# Patient Record
Sex: FEMALE | Race: WHITE | NOT HISPANIC OR LATINO | Employment: FULL TIME | URBAN - NONMETROPOLITAN AREA
[De-identification: names, ages, dates, MRNs, and addresses within clinical notes are randomized per-mention and may not be internally consistent; named-entity substitution may affect disease eponyms.]

---

## 2024-01-10 ENCOUNTER — TRANSCRIBE ORDERS (OUTPATIENT)
Dept: ADMINISTRATIVE | Facility: HOSPITAL | Age: 39
End: 2024-01-10
Payer: COMMERCIAL

## 2024-01-10 DIAGNOSIS — Z12.31 ENCOUNTER FOR SCREENING MAMMOGRAM FOR MALIGNANT NEOPLASM OF BREAST: Primary | ICD-10-CM

## 2024-02-26 LAB
NCCN CRITERIA FLAG: NORMAL
TYRER CUZICK SCORE: 13.4

## 2024-03-27 ENCOUNTER — HOSPITAL ENCOUNTER (OUTPATIENT)
Dept: MAMMOGRAPHY | Facility: HOSPITAL | Age: 39
Discharge: HOME | End: 2024-03-27
Admitting: OBSTETRICS & GYNECOLOGY
Payer: COMMERCIAL

## 2024-03-27 DIAGNOSIS — Z12.31 ENCOUNTER FOR SCREENING MAMMOGRAM FOR MALIGNANT NEOPLASM OF BREAST: ICD-10-CM

## 2024-03-27 PROCEDURE — 77063 BREAST TOMOSYNTHESIS BI: CPT

## 2024-03-27 PROCEDURE — 77067 SCR MAMMO BI INCL CAD: CPT

## 2024-04-22 ENCOUNTER — HOSPITAL ENCOUNTER (OUTPATIENT)
Dept: ULTRASOUND IMAGING | Facility: HOSPITAL | Age: 39
Discharge: HOME | End: 2024-04-22
Admitting: OBSTETRICS & GYNECOLOGY
Payer: COMMERCIAL

## 2024-04-22 DIAGNOSIS — R92.8 ABNORMAL MAMMOGRAM: ICD-10-CM

## 2024-04-22 PROCEDURE — 76642 ULTRASOUND BREAST LIMITED: CPT

## 2024-05-28 ENCOUNTER — OFFICE VISIT (OUTPATIENT)
Age: 39
End: 2024-05-28
Payer: COMMERCIAL

## 2024-05-28 VITALS
WEIGHT: 120 LBS | SYSTOLIC BLOOD PRESSURE: 104 MMHG | HEIGHT: 64 IN | BODY MASS INDEX: 20.49 KG/M2 | DIASTOLIC BLOOD PRESSURE: 68 MMHG

## 2024-05-28 DIAGNOSIS — R87.615 UNSATISFACTORY CERVICAL PAPANICOLAOU SMEAR: ICD-10-CM

## 2024-05-28 DIAGNOSIS — Z12.4 ENCOUNTER FOR SCREENING FOR CERVICAL CANCER: Primary | ICD-10-CM

## 2024-05-28 PROCEDURE — 99202 OFFICE O/P NEW SF 15 MIN: CPT | Performed by: OBSTETRICS & GYNECOLOGY

## 2024-05-28 PROCEDURE — G0123 SCREEN CERV/VAG THIN LAYER: HCPCS | Performed by: OBSTETRICS & GYNECOLOGY

## 2024-05-28 PROCEDURE — 87624 HPV HI-RISK TYP POOLED RSLT: CPT | Performed by: OBSTETRICS & GYNECOLOGY

## 2024-05-30 LAB
GEN CATEG CVX/VAG CYTO-IMP: NORMAL
HPV I/H RISK 4 DNA CVX QL PROBE+SIG AMP: NOT DETECTED
LAB AP CASE REPORT: NORMAL
LAB AP GYN ADDITIONAL INFORMATION: NORMAL
LAB AP GYN OTHER FINDINGS: NORMAL
Lab: NORMAL
PATH INTERP SPEC-IMP: NORMAL
STAT OF ADQ CVX/VAG CYTO-IMP: NORMAL

## 2024-06-03 NOTE — PROGRESS NOTES
"Chief Complaint  Gynecologic Exam (Pt here for repeat pap, previous [facility] pt, last pap 1-8-2024, unsatisfactory, neg HPV.)    Subjective        Rocio Cochran presents to [facility] for repeat pap smear after pap smear with Dr. Saucedo was unsatisfactory. Pt reports she was on her cycle at the time of her annual exam. Pt without complaints today. Reports normal menses with normal flow.   History of Present Illness    Objective   Vital Signs:  /68   Ht 162.6 cm (64\")   Wt 54.4 kg (120 lb)   BMI 20.60 kg/m²   Estimated body mass index is 20.6 kg/m² as calculated from the following:    Height as of this encounter: 162.6 cm (64\").    Weight as of this encounter: 54.4 kg (120 lb).       BMI is within normal parameters. No other follow-up for BMI required.      Physical Exam   Normal external genitalia, cervix 1x1 cm smooth without lesions, pap smear obtained, vaginal mucosa pink without lesions, bimanual exam NT/no masses  Result Review :                     Assessment and Plan     Diagnoses and all orders for this visit:    1. Encounter for screening for cervical cancer (Primary)  -     Liquid-based Pap Smear, Screening  -     HPV DNA Probe, Direct - ThinPrep Vial, Cervix, Endocervix    2. Unsatisfactory cervical Papanicolaou smear  39 y/o G0 here for repeat pap smear after unsatisfactory specimen at annual exam. No complaints and pt doing well. RTC yearly.            Follow Up     No follow-ups on file.  Patient was given instructions and counseling regarding her condition or for health maintenance advice. Please see specific information pulled into the AVS if appropriate.         "

## 2025-06-03 ENCOUNTER — OFFICE VISIT (OUTPATIENT)
Age: 40
End: 2025-06-03
Payer: COMMERCIAL

## 2025-06-03 VITALS
SYSTOLIC BLOOD PRESSURE: 112 MMHG | HEIGHT: 64 IN | BODY MASS INDEX: 21.54 KG/M2 | DIASTOLIC BLOOD PRESSURE: 76 MMHG | WEIGHT: 126.2 LBS

## 2025-06-03 DIAGNOSIS — Z12.4 ENCOUNTER FOR SCREENING FOR CERVICAL CANCER: ICD-10-CM

## 2025-06-03 DIAGNOSIS — Z01.419 WOMEN'S ANNUAL ROUTINE GYNECOLOGICAL EXAMINATION: Primary | ICD-10-CM

## 2025-06-03 DIAGNOSIS — Z12.31 ENCOUNTER FOR SCREENING MAMMOGRAM FOR MALIGNANT NEOPLASM OF BREAST: ICD-10-CM

## 2025-06-03 PROCEDURE — G0123 SCREEN CERV/VAG THIN LAYER: HCPCS | Performed by: OBSTETRICS & GYNECOLOGY

## 2025-06-03 PROCEDURE — 99459 PELVIC EXAMINATION: CPT | Performed by: OBSTETRICS & GYNECOLOGY

## 2025-06-03 PROCEDURE — 99396 PREV VISIT EST AGE 40-64: CPT | Performed by: OBSTETRICS & GYNECOLOGY

## 2025-06-03 PROCEDURE — 87624 HPV HI-RISK TYP POOLED RSLT: CPT | Performed by: OBSTETRICS & GYNECOLOGY

## 2025-06-03 NOTE — PROGRESS NOTES
"Subjective     Rocio Cochran is a 39 y.o. female here for annual exam. Pt doing well. Menses are regular with normal flow. Would like pap smear. Needs mammogram.  Denies vaginal discharge, dyspareunia, pelvic pain.      Gynecologic Exam          /76   Ht 162.6 cm (64.02\")   Wt 57.2 kg (126 lb 3.2 oz)   LMP 05/28/2025 (Exact Date)   Breastfeeding No   BMI 21.65 kg/m²     No outpatient encounter medications on file as of 6/3/2025.     No facility-administered encounter medications on file as of 6/3/2025.       Surgical History  Past Surgical History:   Procedure Laterality Date    WISDOM TOOTH EXTRACTION  2003       Family History  Family History   Problem Relation Age of Onset    Hypertension Father     High cholesterol Mother     Uterine cancer Neg Hx     Ovarian cancer Neg Hx     Colon cancer Neg Hx     Melanoma Neg Hx     Prostate cancer Neg Hx        The following portions of the patient's history were reviewed and updated as appropriate: allergies, current medications, past family history, past medical history, past social history, past surgical history, and problem list.    Review of Systems    Objective   Physical Exam  Exam conducted with a chaperone present.   Constitutional:       Appearance: Normal appearance.   Cardiovascular:      Rate and Rhythm: Normal rate and regular rhythm.      Pulses: Normal pulses.   Pulmonary:      Effort: Pulmonary effort is normal.      Breath sounds: Normal breath sounds.   Chest:   Breasts:     Right: Normal. No inverted nipple, mass, nipple discharge or skin change.      Left: Normal. No inverted nipple, mass, nipple discharge or skin change.   Abdominal:      General: Abdomen is flat. Bowel sounds are normal.      Palpations: Abdomen is soft.   Genitourinary:     Comments: Normal external genitalia, vaginal mucosa pink without lesions, cervix smooth without lesions, no prolapse, bimanual exam with anteverted uterus, normal size, no masses, no CMT, nontender, no " vaginal discharge  Musculoskeletal:      Cervical back: Normal range of motion and neck supple.   Neurological:      Mental Status: She is alert.   Psychiatric:         Mood and Affect: Mood normal.         Behavior: Behavior normal.         Assessment & Plan   Diagnoses and all orders for this visit:    1. Women's annual routine gynecological examination (Primary)  40 y/o CF here for annual exam. Normal breast and pelvic exam today. Pap smear today. RTC yearly.   2. Encounter for screening mammogram for malignant neoplasm of breast  -     Mammo Screening Digital Tomosynthesis Bilateral With CAD    3. Encounter for screening for cervical cancer  -     Liquid-based Pap Smear, Screening    BMI Body mass index is 21.65 kg/m²..   Colonoscopy: at age 43  Mammogram: Ordered today  DEXA:  at age 63  Pap smear, per ASCCP guidelines, Done today  STI screening: declines  Contraception: declines    AVS/Patient education handout on the following as well w/discussion  Encouraged self breast awareness.  Encouraged proactive weight management and importance of maintaining a healthy weight.   Encouraged regular exercise and the importance of same, in regards to a healthy heart as well as helping to maintain her weight and improving her mental health.          BMI is within normal parameters. No other follow-up for BMI required.      Xochilt Melara MD  6/3/2025

## 2025-06-27 ENCOUNTER — HOSPITAL ENCOUNTER (OUTPATIENT)
Dept: MAMMOGRAPHY | Facility: HOSPITAL | Age: 40
Discharge: HOME | End: 2025-06-27
Admitting: OBSTETRICS & GYNECOLOGY
Payer: COMMERCIAL

## 2025-06-27 PROCEDURE — 77067 SCR MAMMO BI INCL CAD: CPT

## 2025-06-27 PROCEDURE — 77063 BREAST TOMOSYNTHESIS BI: CPT
